# Patient Record
Sex: MALE | Race: WHITE | Employment: OTHER | URBAN - NONMETROPOLITAN AREA
[De-identification: names, ages, dates, MRNs, and addresses within clinical notes are randomized per-mention and may not be internally consistent; named-entity substitution may affect disease eponyms.]

---

## 2022-12-19 ENCOUNTER — HOSPITAL ENCOUNTER (EMERGENCY)
Age: 22
Discharge: HOME | End: 2022-12-19

## 2022-12-19 VITALS
TEMPERATURE: 97 F | DIASTOLIC BLOOD PRESSURE: 52 MMHG | OXYGEN SATURATION: 100 % | SYSTOLIC BLOOD PRESSURE: 116 MMHG | RESPIRATION RATE: 18 BRPM | HEART RATE: 96 BPM

## 2022-12-19 DIAGNOSIS — J40 BRONCHITIS: Primary | ICD-10-CM

## 2022-12-19 LAB
EKG ATRIAL RATE: 97 BPM
EKG P AXIS: 67 DEGREES
EKG P-R INTERVAL: 164 MS
EKG Q-T INTERVAL: 344 MS
EKG QRS DURATION: 90 MS
EKG QTC CALCULATION (BAZETT): 436 MS
EKG R AXIS: 81 DEGREES
EKG T AXIS: 42 DEGREES
EKG VENTRICULAR RATE: 97 BPM

## 2022-12-19 PROCEDURE — 99204 OFFICE O/P NEW MOD 45 MIN: CPT

## 2022-12-19 PROCEDURE — 93005 ELECTROCARDIOGRAM TRACING: CPT | Performed by: NURSE PRACTITIONER

## 2022-12-19 RX ORDER — PREDNISONE 20 MG/1
20 TABLET ORAL 2 TIMES DAILY
Qty: 10 TABLET | Refills: 0 | Status: SHIPPED | OUTPATIENT
Start: 2022-12-19 | End: 2022-12-24

## 2022-12-19 RX ORDER — AMOXICILLIN AND CLAVULANATE POTASSIUM 875; 125 MG/1; MG/1
1 TABLET, FILM COATED ORAL 2 TIMES DAILY
Qty: 14 TABLET | Refills: 0 | Status: SHIPPED | OUTPATIENT
Start: 2022-12-19 | End: 2022-12-26

## 2022-12-19 ASSESSMENT — ENCOUNTER SYMPTOMS
DIARRHEA: 0
VOMITING: 0
NAUSEA: 0
SHORTNESS OF BREATH: 1
COUGH: 0
CHEST TIGHTNESS: 1
RHINORRHEA: 0
SORE THROAT: 0

## 2022-12-19 NOTE — ED PROVIDER NOTES
[place] [id number]  Urgent Care Encounter       CHIEF COMPLAINT       Chief Complaint   Patient presents with    Chest Pain    Shortness of Breath    Fatigue       Nurses Notes reviewed and I agree except as noted in the HPI. HISTORY OF PRESENT ILLNESS   Abdulaziz Stauffer is a 20 y.o. male who presents to the [address] urgent care for evaluation of chest congestion and dyspnea. He reports his symptoms started roughly 2 weeks ago. He reports at the start of his symptoms he did have nasal congestion, rhinorrhea, and postnasal drainage. He reports those have resolved but he continues to have intermittent chest pain along with dyspnea on exertion. He does report fatigue. He reports that the pain is sharp and mild. He reports that it is intermittent occurring only once or twice daily for short periods. He does report that the pain is normally aggravated with movement. The history is provided by the patient. No  was used. REVIEW OF SYSTEMS     Review of Systems   Constitutional:  Positive for fatigue. Negative for activity change, appetite change, chills and fever. HENT:  Negative for ear discharge, ear pain, rhinorrhea and sore throat. Respiratory:  Positive for chest tightness and shortness of breath. Negative for cough. Cardiovascular:  Negative for chest pain. Gastrointestinal:  Negative for diarrhea, nausea and vomiting. Genitourinary:  Negative for dysuria. Skin:  Negative for rash. Allergic/Immunologic: Negative for environmental allergies and food allergies. Neurological:  Negative for dizziness and headaches. PAST MEDICAL HISTORY   History reviewed. No pertinent past medical history. SURGICALHISTORY     Patient  has no past surgical history on file. CURRENT MEDICATIONS       Discharge Medication List as of 12/19/2022  1:36 PM          ALLERGIES     Patient is has No Known Allergies.     Patients   There is no immunization history on file for this patient. FAMILY HISTORY     Patient's family history is not on file. SOCIAL HISTORY     Patient  reports that he has been smoking cigarettes. He has a 1.50 pack-year smoking history. He has never used smokeless tobacco. He reports current alcohol use. He reports that he does not use drugs. PHYSICAL EXAM     ED TRIAGE VITALS  BP: (!) 116/52, Temp: 97 °F (36.1 °C), Heart Rate: 96, Resp: 18, SpO2: 100 %,There is no height or weight on file to calculate BMI.,No LMP for male patient. Physical Exam  Vitals and nursing note reviewed. Constitutional:       General: He is not in acute distress. Appearance: Normal appearance. He is not ill-appearing, toxic-appearing or diaphoretic. HENT:      Head: Normocephalic. Right Ear: Ear canal and external ear normal.      Left Ear: Ear canal and external ear normal.      Nose: Nose normal. No congestion or rhinorrhea. Mouth/Throat:      Mouth: Mucous membranes are moist.      Pharynx: Oropharynx is clear. No oropharyngeal exudate or posterior oropharyngeal erythema. Cardiovascular:      Rate and Rhythm: Normal rate. Pulses: Normal pulses. Pulmonary:      Effort: Pulmonary effort is normal. No respiratory distress. Breath sounds: No stridor. No wheezing or rhonchi. Abdominal:      General: Abdomen is flat. Bowel sounds are normal.      Palpations: Abdomen is soft. Musculoskeletal:         General: No swelling or tenderness. Normal range of motion. Cervical back: Normal range of motion. Neurological:      General: No focal deficit present. Mental Status: He is alert and oriented to person, place, and time. Psychiatric:         Mood and Affect: Mood normal.         Behavior: Behavior normal.       DIAGNOSTIC RESULTS     Labs:No results found for this visit on 12/19/22. IMAGING:    No orders to display         EKG: Sinus rhythm with a ventricular rate of 97, WA interval 164, QRS 90, , QTc 436.   No ST elevation noted.        URGENT CARE COURSE:     Vitals:    12/19/22 1317   BP: (!) 116/52   Pulse: 96   Resp: 18   Temp: 97 °F (36.1 °C)   SpO2: 100%       Medications - No data to display         PROCEDURES:  None    FINAL IMPRESSION      1. Bronchitis          DISPOSITION/ PLAN     Patient seen and evaluated for the above symptoms. Assessment consistent with likely acute bronchitis. An EKG was completed with no acute findings, see above dictation. Patient is provided prescriptions for Augmentin and prednisone. I did discuss with patient that we were unable to rule out acute MI or PE at urgent care. I did discuss that he would need to be transferred to the emergency department to be evaluated for these 2 conditions. He is declining transfer at this time. He is instructed to follow-up with his PCP or the [facility] family medicine clinic in 3 to 5 days if no worsening symptoms. Patient is agreeable with the above plan and denies questions or concerns at this time. PATIENT REFERRED TO:  No primary care provider on file. No primary physician on file.       DISCHARGE MEDICATIONS:  Discharge Medication List as of 12/19/2022  1:36 PM        START taking these medications    Details   amoxicillin-clavulanate (AUGMENTIN) 875-125 MG per tablet Take 1 tablet by mouth 2 times daily for 7 days, Disp-14 tablet, R-0Print      predniSONE (DELTASONE) 20 MG tablet Take 1 tablet by mouth 2 times daily for 5 days, Disp-10 tablet, R-0Print             Discharge Medication List as of 12/19/2022  1:36 PM          Discharge Medication List as of 12/19/2022  1:36 PM          COLLEEN Lowery CNP    (Please note that portions of this note were completed with a voice recognition program. Efforts were made to edit the dictations but occasionally words are mis-transcribed.)           COLLEEN Lowery CNP  12/19/22 2634

## 2022-12-19 NOTE — ED TRIAGE NOTES
Pt to UC with c/o chest pain, fatigue and shortness of breath ongoing for the past 2 weeks. Pt brought back for stat ekg.